# Patient Record
Sex: FEMALE | Race: WHITE | Employment: OTHER | URBAN - METROPOLITAN AREA
[De-identification: names, ages, dates, MRNs, and addresses within clinical notes are randomized per-mention and may not be internally consistent; named-entity substitution may affect disease eponyms.]

---

## 2025-07-31 ENCOUNTER — OFFICE VISIT (OUTPATIENT)
Dept: ORTHOPEDIC SURGERY | Facility: CLINIC | Age: 65
End: 2025-07-31
Payer: MEDICARE

## 2025-07-31 ENCOUNTER — HOSPITAL ENCOUNTER (OUTPATIENT)
Dept: RADIOLOGY | Facility: EXTERNAL LOCATION | Age: 65
Discharge: HOME | End: 2025-07-31

## 2025-07-31 ENCOUNTER — HOSPITAL ENCOUNTER (OUTPATIENT)
Dept: RADIOLOGY | Facility: CLINIC | Age: 65
Discharge: HOME | End: 2025-07-31
Payer: MEDICARE

## 2025-07-31 DIAGNOSIS — M79.671 RIGHT FOOT PAIN: ICD-10-CM

## 2025-07-31 DIAGNOSIS — M20.5X1 CLAW TOE, ACQUIRED, RIGHT: ICD-10-CM

## 2025-07-31 DIAGNOSIS — M20.21 HALLUX RIGIDUS OF RIGHT FOOT: Primary | ICD-10-CM

## 2025-07-31 DIAGNOSIS — M77.41 METATARSALGIA OF RIGHT FOOT: ICD-10-CM

## 2025-07-31 PROCEDURE — 73630 X-RAY EXAM OF FOOT: CPT | Mod: RIGHT SIDE | Performed by: RADIOLOGY

## 2025-07-31 PROCEDURE — 1125F AMNT PAIN NOTED PAIN PRSNT: CPT | Performed by: ORTHOPAEDIC SURGERY

## 2025-07-31 PROCEDURE — 99205 OFFICE O/P NEW HI 60 MIN: CPT | Performed by: ORTHOPAEDIC SURGERY

## 2025-07-31 PROCEDURE — 73630 X-RAY EXAM OF FOOT: CPT | Mod: RT

## 2025-07-31 PROCEDURE — 99203 OFFICE O/P NEW LOW 30 MIN: CPT | Performed by: ORTHOPAEDIC SURGERY

## 2025-07-31 ASSESSMENT — PAIN - FUNCTIONAL ASSESSMENT: PAIN_FUNCTIONAL_ASSESSMENT: 0-10

## 2025-07-31 ASSESSMENT — PAIN SCALES - GENERAL: PAINLEVEL_OUTOF10: 3

## 2025-07-31 NOTE — PATIENT INSTRUCTIONS
We have discussed your treatment options, risks and benefits and surgery for your condition was selected and recommended.  Additional laboratory studies, EKG and chest x-ray may need to be performed prior to surgery, particularly if you have chronic health problems.  We will give you an order to get further testing done if needed.  You may have to see your primary care doctor or the [facility] to be medically cleared (healthy enough to safely undergo surgery without further treatment for your medical conditions) prior to your surgery.  Please do not eat, drink, or chew anything after midnight the night before your surgery.  Please call Thi [facility] at [phone] to schedule your surgery and if you have any further questions.

## 2025-07-31 NOTE — PROGRESS NOTES
NPV-   History of Present Illness  65 y.o.female here for right foot pain  1. Right foot pain  XR foot right 3+ views        Mechanism of injury: none  Date of Injury/Pain: ongoing for over 6 years  Location of pain: great toe, 2nd and 3rd toe  Frequency of Pain: worse with walking or standing  Associated symptoms? Swelling.  Previous treatment?  Surgery, orthotics, injections  PMH and PSH: cardiac stent placement on plavix currently (she cannot take asa due to gastroparesis that she gets with asa), right foot 3rd MT weil osteotomy with PIP pinning and second toe plantar plate MTP repair about 3 years ago    REVIEW OF SYSTEMS  27 point review of systems negative except what is stated in HPI    Constitutional Exam: patient's height and weight reviewed, well-kempt  Psychiatric Exam: alert and oriented x 3, appropriate mood and behavior  Eye Exam: JIMMY NORMAN  Pulmonary Exam: breathing non-labored, no apparent distress  Lymphatic exam: no appreciable lymphadenopathy in the lower extremities  Cardiovascular exam: DP pulses 2+ bilaterally, PT 2+ bilaterally, toes are pink with good capillary refill, no pitting edema  Skin exam: no open lesions, rashes, abrasions or ulcerations  Neurological exam: sensation to light touch intact in both lower extremities in peripheral and dermatomal distributions (except for any abnormalities noted in musculoskeletal exam)    PHYSICAL EXAM  On examination of the right ankle/foot: significant pain on palpation of great toe MTP with palpable dorsal osteophyte and painful ROM limited significantly  Normal gait  Pes planus  alignment  Minimal swelling and no ecchymosis of the lateral foot. no erythema. Skin intact; no ulcers or lesions.   Normal ROM in  ankle plantarflexion, dorsiflexion, inversion and eversion; minimal Decreased  ROM in 2nd and 3rd toe, normal 4th and 5th toe flexion/extension and decreased  1st MTP flexion/extension  Normal strength in ankle plantarflexion, dorsiflexion,  inversion and eversion; normal strength with great toe flexion/extension. No pain with eversion  Tenderness to palpation: 1st, 2nd and 3rd MTP joint  No tenderness to palpation over the Achilles, medial and lateral malleolus, posterior tibial tendon, peroneal tendon, ATFL, deltoid ligament, talus or navicular.  no tenderness to palpation over heel, plantar arch, 5th metatarsal, base of 1st metatarsal/2nd metatarsal, sesamoids, medial cuneiform, navicular,or 2nd or 3rd intermetarsal space.  Neurovascularly intact. Good capillary refill. No sensory deficit to light touch. Normal proprioception. Dorsalis pedis and posterior tibial pulses 2+ bilaterally.    - Angeles's test                              -   - Squeeze test    IMAGING  I personally reviewed the patient's x-ray images and reports of the right  foot. The xrays show moderate degenerative change of the 1st MTP and 2nd mtp joint with dorsal subluxation. No fractures Normal joint spacing     ASSESSMENT: right hallux rigidus with stress transfer metatarsalgia, second toe clawtoe fixed with subluxed proximal phalanx (hx attempted plantar plate repair), less deformity 3rd and 4th toes     PLAN: Discussed with the patient the complex degenerative related nature of her condition and options for treatment.  She has tried a full course of nonoperative management including orthotics, metatarsal pad, shoe wear adjustments, physician directed activity modification and medications.  She has functionally limiting pain daily.  Discussed recommendation for right great toe MTP joint fusion, second metatarsal weil osteotomy with PIP resection arthroplasty with percutaneous pinning.  We would not recommend revision of 3rd toe due to risks of floppy toe development from further bone resection- our plan is not to do third toe unless impingement occurs with second toe at surgery or in future.  She has mild clawtoe of 4th toe but no pain and so we do not recommend surgery on  painless toe as we can create pain and more problems.  Discussed with her that we cannot make her toes normal and there is risk for deformity recurrence; that is why proper shoewear with wide toe box needed for long term.  By fusing the great toe (reviewed with her that we fuse in dorsiflexed position of MTP joint in order to help with proper gait and reviewed what a fusion is and what position the toe is placed), this will relieve the stress transfer metatarsalgia symptoms in lesser toes.  Discussed with her that this is an outpatient procedure, done under general anesthetic with block along with multimodal pain management protocol to limit narcotic use.  She would have to be nonweightbearing for at least 6 weeks after surgery- placed first in a splint at surgery, then cast, sutures out 3-4 weeks post-op and boot transition at 4-6 weeks depending on progress, pin in toe removed 4-6 weeks after surgery in the office.  No driving until 10-12 weeks post-op.  Discussed with her details of surgical procedure with use of plates, screws and k-wires and risks of surgery (pain, bleeding, infection, toe deformity recurrence, wound healing problems, fusion healing problems, injury to nerves or vessels, DVT, PE, hardware related complications, and other medical issues).  She would like to proceed with surgery- discussed home preparation options to help recovery including having family member help available, knee walker, shower chair, shower stool, setting up to live on one floor and avoid stairs, cast cover.  She will require medical clearance by cardiology and medicine prior to surgery.  She will have to stop plavix 5 days prior to surgery to decrease bleeding risks.      I discussed with patient the procedure in detail, risks and benefits of procedure, post-op protocol, anesthetic used with possible regional block, timeline of recovery, need for protective weightbearing and/or bracing after procedure, pain management  protocol, DVT prophylaxis protocol, home preparation suggestions, pre-op surgery preparation, and other pertinent information.